# Patient Record
Sex: MALE | Employment: STUDENT | URBAN - METROPOLITAN AREA
[De-identification: names, ages, dates, MRNs, and addresses within clinical notes are randomized per-mention and may not be internally consistent; named-entity substitution may affect disease eponyms.]

---
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
## 2024-05-20 ENCOUNTER — OFFICE VISIT (OUTPATIENT)
Dept: FAMILY MEDICINE CLINIC | Facility: CLINIC | Age: 24
End: 2024-05-20

## 2024-05-20 VITALS
SYSTOLIC BLOOD PRESSURE: 122 MMHG | OXYGEN SATURATION: 96 % | WEIGHT: 269.38 LBS | TEMPERATURE: 98 F | RESPIRATION RATE: 18 BRPM | HEIGHT: 71.5 IN | HEART RATE: 92 BPM | BODY MASS INDEX: 36.89 KG/M2 | DIASTOLIC BLOOD PRESSURE: 86 MMHG

## 2024-05-20 DIAGNOSIS — R06.81 WITNESSED EPISODE OF APNEA: ICD-10-CM

## 2024-05-20 DIAGNOSIS — R68.82 LOW LIBIDO: ICD-10-CM

## 2024-05-20 DIAGNOSIS — R06.83 SNORING: ICD-10-CM

## 2024-05-20 DIAGNOSIS — R40.0 DAYTIME SOMNOLENCE: ICD-10-CM

## 2024-05-20 DIAGNOSIS — Z00.00 ROUTINE GENERAL MEDICAL EXAMINATION AT A HEALTH CARE FACILITY: Primary | ICD-10-CM

## 2024-05-20 DIAGNOSIS — R53.83 LOW ENERGY: ICD-10-CM

## 2024-05-20 DIAGNOSIS — Z11.3 ROUTINE SCREENING FOR STI (SEXUALLY TRANSMITTED INFECTION): ICD-10-CM

## 2024-05-20 PROCEDURE — 99385 PREV VISIT NEW AGE 18-39: CPT | Performed by: FAMILY MEDICINE

## 2024-05-20 PROCEDURE — 3079F DIAST BP 80-89 MM HG: CPT | Performed by: FAMILY MEDICINE

## 2024-05-20 PROCEDURE — 3074F SYST BP LT 130 MM HG: CPT | Performed by: FAMILY MEDICINE

## 2024-05-20 PROCEDURE — 99203 OFFICE O/P NEW LOW 30 MIN: CPT | Performed by: FAMILY MEDICINE

## 2024-05-20 PROCEDURE — 3008F BODY MASS INDEX DOCD: CPT | Performed by: FAMILY MEDICINE

## 2024-05-20 RX ORDER — SUMATRIPTAN 50 MG/1
TABLET, FILM COATED ORAL
COMMUNITY

## 2024-05-20 NOTE — PROGRESS NOTES
Andrew Cee is a 23 year old male who is here for   Chief Complaint   Patient presents with    Well Adult       HPI:     Here for wellness    1. Routine general medical examination at a health care facility  2. Routine screening for STI (sexually transmitted infection)  -due for wellness    3. Low energy  4. Low libido  -interested in getting testosterone checked    5. Snoring  6. Witnessed episode of apnea  7. Daytime somnolence  -not sleeping well  -complains of snoring and witnessed apnea and somnolence during day        Screening:  Diet: eating better  Exercise: active  Sleep: normal  Depression/Anxiety: none    Both grandmothers with skin CA    Testicular CA - counseled     No smoking  No ETOH  No drugs    Sexually active, uses condoms    Wt Readings from Last 6 Encounters:   05/20/24 269 lb 6.4 oz (122.2 kg)   06/25/18 222 lb (100.7 kg) (98%, Z= 2.03)*   06/13/18 218 lb 4.1 oz (99 kg) (98%, Z= 1.96)*   06/06/18 221 lb (100.2 kg) (98%, Z= 2.02)*   05/09/18 225 lb (102.1 kg) (98%, Z= 2.10)*   12/02/17 239 lb 6.4 oz (108.6 kg) (>99%, Z= 2.39)*     * Growth percentiles are based on CDC (Boys, 2-20 Years) data.       Patient Active Problem List   Diagnosis    Environmental and seasonal allergies       Current Outpatient Medications on File Prior to Visit   Medication Sig Dispense Refill    SUMAtriptan 50 MG Oral Tab       cetirizine 10 MG Oral Tab Take 1 tablet (10 mg total) by mouth daily.      Multiple Vitamins-Minerals (MULTI-VITAMIN/MINERALS) Oral Tab Take 1 tablet by mouth daily.       No current facility-administered medications on file prior to visit.       REVIEW OF SYSTEMS:     GENERAL HEALTH: feels well otherwise. No f/c  NEURO: denies any headaches, LH, dizzyness, LOC, falls  VISION: denies any blurred or double vision  RESPIRATORY: denies shortness of breath, cough, or congestion  CARDIOVASCULAR: denies chest pain, pressure or palpitations  GI: denies abdominal pain, constipation, diarrhea, n/v,  BRBPR, melena  : no dysuria, frequency, or hematuria  SKIN: denies any unusual skin lesions or rashes  PSYCH: mood is stable  EXT: denies edema     Wt Readings from Last 6 Encounters:   05/20/24 269 lb 6.4 oz (122.2 kg)   06/25/18 222 lb (100.7 kg) (98%, Z= 2.03)*   06/13/18 218 lb 4.1 oz (99 kg) (98%, Z= 1.96)*   06/06/18 221 lb (100.2 kg) (98%, Z= 2.02)*   05/09/18 225 lb (102.1 kg) (98%, Z= 2.10)*   12/02/17 239 lb 6.4 oz (108.6 kg) (>99%, Z= 2.39)*     * Growth percentiles are based on [facility] (Boys, 2-20 Years) data.       Allergies   Allergen Reactions    Dust     Mold        Patient Active Problem List   Diagnosis    Environmental and seasonal allergies       Family History   Problem Relation Age of Onset    Hypertension Father     Diabetes Father     High Blood Pressure Father     High Cholesterol Father     Other (Other) Father         Low testerone/High triglycerides    Thyroid Disorder Mother         Hypothryoidism    High Cholesterol Mother         Hypercholesterolemia    Diabetes Mother         Gestational    Heart Disease Mother     High Blood Pressure Mother     Skin cancer Mother     Other (Other) Mother         PFO    Heart Disease Paternal Grandmother         Coronary artery disease    Thyroid Disorder Paternal Grandmother         Hypothyroidism    Other (Other) Paternal Grandmother         low blood pressure    Diabetes Maternal Grandfather     Thyroid Disorder Maternal Grandfather         Hypothyroidism    Kidney Disease Maternal Grandfather         Kidney failure    Cancer Maternal Grandfather         pulmonary cancer    Hypertension Maternal Grandmother     Thyroid Disorder Maternal Grandmother         Hypothyroidism    Heart Disease Maternal Grandmother     Diabetes Other         Aunts, Uncles    Cancer Other         Aunts    Cancer Paternal Grandfather         throat cancer    Diabetes Brother     High Blood Pressure Brother     Heart Disorder Brother       Past Medical History:    Abdominal  pain, generalized    Allergic rhinitis    Allergic rhinitis due to allergen    Bronchitis    Constipation    Fever, unspecified    GERD (gastroesophageal reflux disease)    Halitosis    Headache    Myalgia    Pharyngitis    Strep sore throat    Tinea cruris    URI (upper respiratory infection)      Past Surgical History:   Procedure Laterality Date    T&a        Social History:    Social History     Socioeconomic History    Marital status: Single   Tobacco Use    Smoking status: Some Days     Types: Cigarettes    Smokeless tobacco: Never   Vaping Use    Vaping status: Never Used   Substance and Sexual Activity    Alcohol use: Yes    Drug use: No   Other Topics Concern    Caffeine Concern Yes     Comment: Pre-workout 1-2 times weekly and 1 cup of coffee weekly    Stress Concern No    Weight Concern Yes    Special Diet No    Exercise Yes     Comment: Daily    Seat Belt Yes           EXAM:   /86 (BP Location: Left arm, Patient Position: Sitting, Cuff Size: adult)   Pulse 92   Temp 98 °F (36.7 °C) (Temporal)   Resp 18   Ht 5' 11.5\" (1.816 m)   Wt 269 lb 6.4 oz (122.2 kg)   SpO2 96%   BMI 37.05 kg/m²     GENERAL: A&O well developed, well nourished,in no apparent distress  HEENT: atraumatic, MMM, throat is clear  NECK: supple, no jvd, no thyromegaly  LUNGS: clear to auscultation bilateraly, no c/w/r  CARDIO: RRR without murmurs  GI: soft, non-tender, non-distended, no hsm, bs throughout  NEURO: CN II-XII grossly intact  PSYCH: pleasant  EXTREMITIES: no cyanosis, clubbing or edema  SKIN: no rashes,no suspicious lesions    The ASCVD Risk score (Aimee DK, et al., 2019) failed to calculate for the following reasons:    The 2019 ASCVD risk score is only valid for ages 40 to 79    ASSESSMENT AND PLAN:     Health Maintenance  -We discussed the following:  Healthy diet and exercise, immunizations, and cancer screening    -Fasting labs reviewed    Stress Management: counseled    1. Routine general medical examination  at a health care facility  - TSH W Reflex To Free T4  - Lipid Panel  - Comp Metabolic Panel (14)  - CBC With Differential With Platelet    2. Routine screening for STI (sexually transmitted infection)  - Chlamydia/Gc Amplification  - Hepatitis Panel, Acute (4)  - HIV Ag/Ab Combo  - T Pallidum Screening [place]    3. Low energy  4. Low libido  - Testosterone, Total Free, Male [E]; Future    5. Snoring  6. Witnessed episode of apnea  7. Daytime somnolence  -given symptoms, will check sleep study    - Diagnostic Sleep Study-split night PAP implemented if criteria met  - General sleep study; Future        Imaging & Referrals:  None     The patient indicates understanding of these issues and agrees to the plan.  The patient is asked to return in prn.  DARREN LINTON MD    I spent a total of  30 minutes, more than half of which was spent counseling/coordinating care regarding snoring, energy (outside of time for wellness)

## 2024-05-21 NOTE — PATIENT INSTRUCTIONS
Decrease calories to 2000 - 2200 for now until you are used to that    Then set a goal of 1800 - 2000    Continue with exercise as tolerated    Go to [facility] lab for fasting bloodwork and urine test when able    If labs unremarkable - call to schedule sleep study    Followup in 3 months, sooner if needed

## 2024-05-31 LAB
ABSOLUTE BASOPHILS: 26 CELLS/UL (ref 0–200)
ABSOLUTE EOSINOPHILS: 86 CELLS/UL (ref 15–500)
ABSOLUTE LYMPHOCYTES: 2520 CELLS/UL (ref 850–3900)
ABSOLUTE MONOCYTES: 645 CELLS/UL (ref 200–950)
ABSOLUTE NEUTROPHILS: 5323 CELLS/UL (ref 1500–7800)
ALBUMIN/GLOBULIN RATIO: 1.8 (CALC) (ref 1–2.5)
ALBUMIN: 4.7 G/DL (ref 3.6–5.1)
ALKALINE PHOSPHATASE: 53 U/L (ref 36–130)
ALT: 22 U/L (ref 9–46)
AST: 19 U/L (ref 10–40)
BASOPHILS: 0.3 %
BILIRUBIN, TOTAL: 0.6 MG/DL (ref 0.2–1.2)
BUN: 13 MG/DL (ref 7–25)
CALCIUM: 9.4 MG/DL (ref 8.6–10.3)
CARBON DIOXIDE: 29 MMOL/L (ref 20–32)
CHLAMYDIA TRACHOMATIS$RNA, TMA: NOT DETECTED
CHLORIDE: 103 MMOL/L (ref 98–110)
CHOL/HDLC RATIO: 5.3 (CALC)
CHOLESTEROL, TOTAL: 189 MG/DL
CREATININE: 1.05 MG/DL (ref 0.6–1.24)
EGFR: 102 ML/MIN/1.73M2
EOSINOPHILS: 1 %
GLOBULIN: 2.6 G/DL (CALC) (ref 1.9–3.7)
GLUCOSE: 82 MG/DL (ref 65–99)
HDL CHOLESTEROL: 36 MG/DL
HEMATOCRIT: 48.7 % (ref 38.5–50)
HEMOGLOBIN: 16.3 G/DL (ref 13.2–17.1)
LDL-CHOLESTEROL: 131 MG/DL (CALC)
LYMPHOCYTES: 29.3 %
MCH: 28.3 PG (ref 27–33)
MCHC: 33.5 G/DL (ref 32–36)
MCV: 84.7 FL (ref 80–100)
MONOCYTES: 7.5 %
MPV: 11.2 FL (ref 7.5–12.5)
NEISSERIA GONORRHOEAE$RNA, TMA: NOT DETECTED
NEUTROPHILS: 61.9 %
NON-HDL CHOLESTEROL: 153 MG/DL (CALC)
PLATELET COUNT: 213 THOUSAND/UL (ref 140–400)
POTASSIUM: 4.1 MMOL/L (ref 3.5–5.3)
PROTEIN, TOTAL: 7.3 G/DL (ref 6.1–8.1)
RDW: 13.1 % (ref 11–15)
RED BLOOD CELL COUNT: 5.75 MILLION/UL (ref 4.2–5.8)
SODIUM: 139 MMOL/L (ref 135–146)
T. PALLIDUM AB, EIA: NEGATIVE
TRIGLYCERIDES: 116 MG/DL
TSH W/REFLEX TO FT4: 2.55 MIU/L (ref 0.4–4.5)
WHITE BLOOD CELL COUNT: 8.6 THOUSAND/UL (ref 3.8–10.8)

## 2024-07-10 ENCOUNTER — OFFICE VISIT (OUTPATIENT)
Dept: NEUROLOGY | Facility: CLINIC | Age: 24
End: 2024-07-10
Payer: COMMERCIAL

## 2024-07-10 VITALS
WEIGHT: 272 LBS | HEART RATE: 88 BPM | DIASTOLIC BLOOD PRESSURE: 78 MMHG | RESPIRATION RATE: 16 BRPM | SYSTOLIC BLOOD PRESSURE: 126 MMHG | BODY MASS INDEX: 37 KG/M2

## 2024-07-10 DIAGNOSIS — G43.009 MIGRAINE WITHOUT AURA AND WITHOUT STATUS MIGRAINOSUS, NOT INTRACTABLE: Primary | ICD-10-CM

## 2024-07-10 PROCEDURE — 99244 OFF/OP CNSLTJ NEW/EST MOD 40: CPT | Performed by: OTHER

## 2024-07-10 PROCEDURE — 3078F DIAST BP <80 MM HG: CPT | Performed by: OTHER

## 2024-07-10 PROCEDURE — 3074F SYST BP LT 130 MM HG: CPT | Performed by: OTHER

## 2024-07-10 RX ORDER — TOPIRAMATE 25 MG/1
TABLET ORAL
Qty: 120 TABLET | Refills: 0 | Status: SHIPPED | OUTPATIENT
Start: 2024-07-10

## 2024-07-10 RX ORDER — SUMATRIPTAN 50 MG/1
TABLET, FILM COATED ORAL
Qty: 10 TABLET | Refills: 1 | Status: SHIPPED | OUTPATIENT
Start: 2024-07-10

## 2024-07-10 NOTE — PATIENT INSTRUCTIONS
Refill policies:    Allow 2-3 business days for refills; controlled substances may take longer.  Contact your pharmacy at least 5 days prior to running out of medication and have them send an electronic request or submit request through the “request refill” option in your YouTube account.  Refills are not addressed on weekends; covering physicians do not authorize routine medications on weekends.  No narcotics or controlled substances are refilled after noon on Fridays or by on call physicians.  By law, narcotics must be electronically prescribed.  A 30 day supply with no refills is the maximum allowed.  If your prescription is due for a refill, you may be due for a follow up appointment.  To best provide you care, patients receiving routine medications need to be seen at least once a year.  Patients receiving narcotic/controlled substance medications need to be seen at least once every 3 months.  In the event that your preferred pharmacy does not have the requested medication in stock (e.g. Backordered), it is your responsibility to find another pharmacy that has the requested medication available.  We will gladly send a new prescription to that pharmacy at your request.    Scheduling Tests:    If your physician has ordered radiology tests such as MRI or CT scans, please contact Central Scheduling at [phone] right away to schedule the test.  Once scheduled, the [facility] Centralized Referral Team will work with your insurance carrier to obtain pre-certification or prior authorization.  Depending on your insurance carrier, approval may take 3-10 days.  It is highly recommended patients assure they have received an authorization before having a test performed.  If test is done without insurance authorization, patient may be responsible for the entire amount billed.      Precertification and Prior Authorizations:  If your physician has recommended that you have a procedure or additional testing performed the [facility]  Centralized Referral Team will contact your insurance carrier to obtain pre-certification or prior authorization.    You are strongly encouraged to contact your insurance carrier to verify that your procedure/test has been approved and is a COVERED benefit.  Although the [facility] Centralized Referral Team does its due diligence, the insurance carrier gives the disclaimer that \"Although the procedure is authorized, this does not guarantee payment.\"    Ultimately the patient is responsible for payment.   Thank you for your understanding in this matter.  Paperwork Completion:  If you require FMLA or disability paperwork for your recovery, please make sure to either drop it off or have it faxed to our office at [phone]. Be sure the form has your name and date of birth on it.  The form will be faxed to our Forms Department and they will complete it for you.  There is a 25$ fee for all forms that need to be filled out.  Please be aware there is a 10-14 day turnaround time.  You will need to sign a release of information (MARVEL) form if your paperwork does not come with one.  You may call the Forms Department with any questions at [phone].  Their fax number is [phone].

## 2024-07-10 NOTE — PROGRESS NOTES
[facility] - [facility]   Neurology    Andrew Cee Patient Status:  No patient class for patient encounter    2000 MRN [id number]   Location [facility], [facility], [place] PCP DARREN LINTON MD              HPI:   Andrew Cee is a(n) 24 year old male with history of snoring who presents at the request of DARREN LINTON MD for evaluation of headaches. Headaches started 6 years ago, progressively worse in the last several months. Headaches are now 2 times per week. Reports weight gain after he got out of the  in . He works in IT, from home. Works 3rd shift, 12a to 8am, 5 days, then switches to being awake during the day. Has a 5 year old daughter. Has allergies, better now. Was referred for sleep study, but reports it is hard to schedule. Takes imitrex 50mg for headaches. Wakes up with a throbbing headache, no aura. Has light and sound sensitivity. Had a head injury doing construction, headaches increased then to every week from every few months.     Pertinent imaging and laboratory work-up:   Component      Latest Ref Rng 2024   Cholesterol, Total      <200 mg/dL 189    HDL Cholesterol      > OR = 40 mg/dL 36 (L)    Triglycerides      <150 mg/dL 116    LDL Cholesterol Calc      mg/dL (calc) 131 (H)    Chol/HDL Ratio      <5.0 (calc) 5.3 (H)    NON-HDL CHOLESTEROL      <130 mg/dL (calc) 153 (H)       Legend:  (L) Low  (H) High  Component      Latest Ref Rng 2024   TSH      0.40 - 4.50 mIU/L 2.55    TREPONEMA PALLIDUM AB, PARTICLE AGGLUTINATION      NEGATIVE  NEGATIVE        Past Medical History:  Past Medical History:    Abdominal pain, generalized    Allergic rhinitis    Allergic rhinitis due to allergen    Bronchitis    Constipation    Fever, unspecified    GERD (gastroesophageal reflux disease)    Halitosis    Headache    Myalgia    Pharyngitis    Strep sore throat    Tinea cruris    URI (upper respiratory infection)        Past Surgical  History:  Past Surgical History:   Procedure Laterality Date    T&a         Family History:  family history includes Cancer in his maternal grandfather, paternal grandfather, and another family member; Diabetes in his brother, father, maternal grandfather, mother, and another family member; Heart Disease in his maternal grandmother, mother, and paternal grandmother; Heart Disorder in his brother; High Blood Pressure in his brother, father, and mother; High Cholesterol in his father and mother; Hypertension in his father and maternal grandmother; Kidney Disease in his maternal grandfather; Other in his father, mother, and paternal grandmother; Skin cancer in his mother; Thyroid Disorder in his maternal grandfather, maternal grandmother, mother, and paternal grandmother.  Mother has migraines    Social History:   reports that he has been smoking cigarettes. He has never used smokeless tobacco. He reports current alcohol use. He reports that he does not use drugs.    Allergies:  Allergies   Allergen Reactions    Dust     Mold        MEDICATIONS:    Current Outpatient Medications:     topiramate 25 MG Oral Tab, Week 1: 1 tab at night, Week 2: 1 tab BID, Week 3: 1 tab in am, 2 tabs in pm, Week 4 and onward: 2 tabs BID, Disp: 120 tablet, Rfl: 0    SUMAtriptan 50 MG Oral Tab, Take 1 tablet at onset of migraines. Can repeat in 2 hours. Not more than 2 in 24 hours. Take rescue medications not more than 2-3 days per week., Disp: 10 tablet, Rfl: 1    cetirizine 10 MG Oral Tab, Take 1 tablet (10 mg total) by mouth as needed., Disp: , Rfl:     Multiple Vitamins-Minerals (MULTI-VITAMIN/MINERALS) Oral Tab, Take 1 tablet by mouth daily., Disp: , Rfl:       Review of Systems:   A comprehensive 10 point review of systems was completed.     Pertinent positives and negatives noted in the HPI.         PHYSICAL EXAM:   Neurologic Exam  Vitals  Vitals:    07/10/24 1059   BP: 126/78   Pulse: 88   Resp: 16     General Appearance: Patient is  a 24 year old male in no acute distress  Cardiac: Normal rate & regular rhythm  Skin: There are no rashes or other skin lesions.  Musculoskeletal: There is no scoliosis, or joint deformities  Neurologic examination:  Mental status: Patient is alert, attentive, and oriented x 3. Language is coherent and fluent without aphasia. Memory, comprehension and ability to follow commands were intact.   Cranial nerves II-XII: Optic discs were sharp. Pupils were round and reacted to light. Extraocular movements were full. There was no face, jaw, palate or tongue weakness or atrophy. Facial sensation was normal. Hearing was grossly intact. Shoulder shrug was normal.   Motor exam revealed normal muscle bulk and tone. No atrophy or fasciculations. Manual muscle testing revealed MRC grade 5/5 strength throughout including proximal and distal muscles of the arms and legs.  Deep tendon reflexes were 2 at the biceps, brachioradialis, triceps, knee jerk, and ankle jerk. Plantar responses were flexor bilaterally.   Sensory exam revealed normal light touch perception. Vibratory perception and proprioception were intact at the toes. Pinprick and temperature were normal. Romberg sign was absent.  Complex motor skills revealed normal coordination. Finger-nose-finger intact.   Gait was narrow and stable, was able to walk on heels, toes and tandem without any difficulty.     ASSESSMENT/ACTIVE PROBLEM LIST:     Encounter Diagnosis   Name Primary?    Migraine without aura and without status migrainosus, not intractable Yes       Discussion/Plan:  -Headaches consistent with migraines  -Discussed probable triggers in him which are barometric changes, intermittent neck pain, sinus pain, possible shift changes affecting sleep- night shift to weekend, and head injury a few years ago  -If headaches don't respond to medication, or if worsen, would obtain MRI brain. Would also obtain sleep study  -Instructed to keep a headache diary   -Discussed  avoiding migraine triggers and to eat regular meals, practice good sleep hygiene  -Start topamax 50mg BID  -Discussed a few possible side effects, including mental fog  -Continue imitrex 50mg as abortive  -Discussed medication overuse headache and to avoid by taking abortives not more than 2-3 times per week  -Take rescue medication immediately at onset of headache, and use most effective rescue medication (not a combination or alternating days)    Requested Prescriptions     Signed Prescriptions Disp Refills    topiramate 25 MG Oral Tab 120 tablet 0     Sig: Week 1: 1 tab at night, Week 2: 1 tab BID, Week 3: 1 tab in am, 2 tabs in pm, Week 4 and onward: 2 tabs BID    SUMAtriptan 50 MG Oral Tab 10 tablet 1     Sig: Take 1 tablet at onset of migraines. Can repeat in 2 hours. Not more than 2 in 24 hours. Take rescue medications not more than 2-3 days per week.          We discussed in depth regarding the diagnosis, prognosis, treatment. The patient was given ample opportunity to ask questions. All questions and concerns were addressed.     Jeny Escamilla DO  Neuromuscular and General Neurology  [facility]

## (undated) NOTE — Clinical Note
Date: 3/11/2017    Patient Name: Ariela Montgomery          To Whom it may concern: This letter has been written at the patient's request. The above patient was seen at the [facility] for treatment of a medical condition.     This patient sh

## (undated) NOTE — Clinical Note
Dear Dr. Lizz Olivera,   Thank you for referring Paulo Zayas to the [facility] in [place]. He was advised to follow up with you.   Sincerely,  Shaquille Malhotra NP

## (undated) NOTE — ED AVS SNAPSHOT
Yuki Jerry   MRN: [id number]    Department:  [facility] Emergency Department   Date of Visit:  6/13/2018           Disclosure     Insurance plans vary and the physician(s) referred by the ER may not be covered by your plan.  Please contact yo tell this physician (or your personal doctor if your instructions are to return to your personal doctor) about any new or lasting problems. The primary care or specialist physician will see patients referred from the [facility] Emergency Department.  Savita Palmer

## (undated) NOTE — MR AVS SNAPSHOT
[facility] [address] [address] [phone]               Thank you for choosing us for your health care visit with Ailyn Brothers DO.   We are glad to serve you and happy to provide you with this summa ? Do you eat fruits and vegetables every day? ? How many meals do you eat with your family each week? ? What do you usually drink with meals and between meals?   ? How much time do you spend watching TV, on the computer, texting, or playing videogames loyda ? Get 60 minutes of physical activity every day. Focus on FUN, including both organized and free play.   o Count time spent doing chores: car washing, walking the dog, mowing the lawn, sweeping, or cleaning your room!  o “Screen time” doesn’t count, so get and how they spend time together. This is the age when peer pressure can start to be a problem. Life at home. How is your child’s behavior? Does he or she get along with others in the family? Is he or she respectful of you, other adults, and authority?  Do pubic area, under the arms, and on the legs, chest, and face. The voice changes, becoming lower and deeper. As the penis grows and matures, erections and “wet dreams” begin to occur. Reassure your son that this is normal.  Emotional changes.  Along with the limit time for sitting and talking. Sitting and eating together allows for family time. It also lets you see what and how your child eats. Pay attention to portions. Serve portions that make sense for your kids.  Let them stop eating when they’re full—don’ skates, a scooter, or a skateboard, it is also a good idea for your child to wear wrist guards, elbow pads, and knee pads. In the car, all children younger than 13 should sit in the back seat.  Children shorter than 4'9\" (57 inches) should continue to use some they’ve never met in person. To teach your child how to use social media responsibly:  Set limits for the use of cell phones, the computer, and the Internet.  Remind your child that you can check the web browser history and cell phone logs to know how cetirizine 10 MG Tabs   Take 10 mg by mouth daily.    Commonly known as:  ZYRTEC           EPIPEN 2-BALJINDER 0.3 MG/0.3ML Soaj   Generic drug:  EPINEPHrine   Indications: for auto-immune therapy (allergy shots)  X once a week           Fluticasone Propionate 50

## (undated) NOTE — Clinical Note
Dear Dr. Delisa Carias,       Thank you for referring Marcin Purcell to the [facility].   Sincerely,  MEDHAT Abad

## (undated) NOTE — Clinical Note
Dear Dr. Jordon South,       Thank you for referring Lauren Isaacs to the [facility].   Sincerely,  MEDHAT Coffey

## (undated) NOTE — MR AVS SNAPSHOT
[facility]  [address] [place]  [phone]               Thank you for choosing us for your health care visit with Wil Sender, REHAN. We are glad to serve you and happy to provide you with this summary of your visit.   Ple prescribed. Nasal sprays work the fastest. Use one that contains phenylephrine or oxymetazoline. First blow the nose gently. Then use the spray. Do not use these medicines more often than directed on the label or symptoms may get worse.  You may also use ta professional's instructions.              Allergies as of Jan 31, 2017     Dust     Mold                 Today's Vital Signs     BP Pulse    110/64 mmHg (19 %, Z = -0.89 / 37 %, Z = -0.33*) 91    Temp Height    97.9 °F (36.6 °C) (Oral) 70.5\" (72 %*, Z = 0. An initiative of the American Academy of Pediatrics    Fact Sheet: Healthy Active Living for Families    Healthy nutrition starts as early as infancy with breastfeeding.  Once your baby begins eating solid foods, introduce nutritious foods early on and ofte

## (undated) NOTE — MR AVS SNAPSHOT
[facility]  [address] [place]  [phone]               Thank you for choosing us for your health care visit with TRACY Wilde. We are glad to serve you and happy to provide you with this summary of your visit.   Ana Follow up with your healthcare provider, or as advised, in 2 weeks if all symptoms have not gotten better, or if hearing doesn't go back to normal within 1 month.   When to seek medical advice  Call your healthcare provider right away if any of these occur: Some people also find ice packs effective for relieving pain. Medicines  Your doctor may prescribe medications to help treat your sinusitis. If you have an infection, antibiotics can help clear it up.  If you are prescribed antibiotics, take all pills on s These medications were sent to [facility] [address] [facility], [address] [facility] [phone], [phone]  [place] [address]     Phone:  [phone]    - Amoxicillin-Pot Clavulanate 875-125 MG Tabs            MyChart     Sign up fo

## (undated) NOTE — LETTER
07/27/17        [address] [facility] [address] [address]      Dear Dre Knows records indicate that you have outstanding lab work and or testing that was ordered for you and has not yet been completed:          Lipid Panel with reflex